# Patient Record
Sex: FEMALE | Race: OTHER | NOT HISPANIC OR LATINO | URBAN - METROPOLITAN AREA
[De-identification: names, ages, dates, MRNs, and addresses within clinical notes are randomized per-mention and may not be internally consistent; named-entity substitution may affect disease eponyms.]

---

## 2018-01-31 ENCOUNTER — EMERGENCY (EMERGENCY)
Facility: HOSPITAL | Age: 53
LOS: 1 days | Discharge: HOME | End: 2018-01-31
Attending: EMERGENCY MEDICINE | Admitting: EMERGENCY MEDICINE
Payer: COMMERCIAL

## 2018-01-31 VITALS
WEIGHT: 169.98 LBS | HEIGHT: 62 IN | RESPIRATION RATE: 16 BRPM | DIASTOLIC BLOOD PRESSURE: 94 MMHG | OXYGEN SATURATION: 100 % | TEMPERATURE: 99 F | SYSTOLIC BLOOD PRESSURE: 167 MMHG | HEART RATE: 72 BPM

## 2018-01-31 PROCEDURE — 99282 EMERGENCY DEPT VISIT SF MDM: CPT

## 2018-01-31 PROCEDURE — 99284 EMERGENCY DEPT VISIT MOD MDM: CPT

## 2018-01-31 RX ORDER — ACETAMINOPHEN 500 MG
650 TABLET ORAL ONCE
Qty: 0 | Refills: 0 | Status: COMPLETED | OUTPATIENT
Start: 2018-01-31 | End: 2018-01-31

## 2018-01-31 RX ADMIN — Medication 650 MILLIGRAM(S): at 13:09

## 2018-01-31 NOTE — ED PROVIDER NOTE - PHYSICAL EXAMINATION
Home
Attending note. Patient is alert and in no acute distress. Patient has a tender scalp on the left parietal and occipital areas. There is a small hematoma without abrasion. Pupils are 3 mm equal reactive. Fundi are normal. Neck is nontender. Patient has full range of motion of her neck without neurologic symptoms. Patient has slight tenderness in the left trapezius muscle area patient has full range of motion of the shoulders and the W. pain or tenderness. Patient has full range of motion of the lowest remedy without pain or tenderness. There is no CVA tenderness. His pelvis tenderness. Neurologic examination is intact including motor, cerebellar, sensory, speech. Romberg is negative.

## 2018-01-31 NOTE — ED ADULT TRIAGE NOTE - CHIEF COMPLAINT QUOTE
I fell in the bathroom hit my back and head; I think I passed out; no I wasn't dizzy before; I slipped on the soap

## 2018-01-31 NOTE — ED PROVIDER NOTE - MUSCULOSKELETAL MINIMAL EXAM
left upper trap tenderness, left forearm superficial, minimal tenderness, right anterior hip minimally tender

## 2018-01-31 NOTE — ED PROVIDER NOTE - WEIGHT BEARING
Tobacco use-I  Informed about risk of wound healing problem ,infection,lung complications,thrombosis with tobacco use   Wants to try Nicotine patches    able

## 2018-01-31 NOTE — ED PROVIDER NOTE - OBJECTIVE STATEMENT
52 year old female who states that she had a slip and fall yesterday in the shower, hitting the back of her head yesterday around 10PM.  As per , their son noted LOC for approx 1-2 min.  Complains of a headache today. No dizziness, seizure activity, photophobia.  Complains of back pain. 52 year old female who states that she had a slip and fall yesterday in the shower, hitting the back of her head yesterday around 10PM.  As per , their son noted LOC for approx 1-2 min.  Complains of a headache today. No dizziness, seizure activity, photophobia.  Complains of back pain.       Attending note. Patient was seen in fast track from #2. Agree with the above. Patient slipped in the shower last evening approximately 10:30 striking her head. Patient reports brief LOC. Patient denies any vomiting, numbness paresthesia, chest area pain or bowel pain. Patient complains of mild headache and bruising to her left scalp and occiput. As well as pain in the left upper back. Patient denies any dizziness, confusion or disorientation.

## 2018-01-31 NOTE — ED ADULT NURSE NOTE - OBJECTIVE STATEMENT
52 y.o female c c/o fall. Pt fell in bathtub yesterday when showering. +LOC. pain to L shoulder/ HA. no vomiting. Pain to R hip. Not on blood thinners. ALer and orientated x3. Family at bedside.

## 2020-02-12 ENCOUNTER — OUTPATIENT (OUTPATIENT)
Dept: OUTPATIENT SERVICES | Facility: HOSPITAL | Age: 55
LOS: 1 days | End: 2020-02-12
Payer: COMMERCIAL

## 2020-02-12 ENCOUNTER — APPOINTMENT (OUTPATIENT)
Dept: CT IMAGING | Facility: IMAGING CENTER | Age: 55
End: 2020-02-12
Payer: COMMERCIAL

## 2020-02-12 DIAGNOSIS — R31.29 OTHER MICROSCOPIC HEMATURIA: ICD-10-CM

## 2020-02-12 PROCEDURE — 74178 CT ABD&PLV WO CNTR FLWD CNTR: CPT | Mod: 26

## 2020-02-12 PROCEDURE — 74178 CT ABD&PLV WO CNTR FLWD CNTR: CPT

## 2020-02-12 PROCEDURE — 82565 ASSAY OF CREATININE: CPT

## 2023-12-09 ENCOUNTER — APPOINTMENT (OUTPATIENT)
Dept: MAMMOGRAPHY | Facility: IMAGING CENTER | Age: 58
End: 2023-12-09
Payer: COMMERCIAL

## 2023-12-09 ENCOUNTER — OUTPATIENT (OUTPATIENT)
Dept: OUTPATIENT SERVICES | Facility: HOSPITAL | Age: 58
LOS: 1 days | End: 2023-12-09
Payer: COMMERCIAL

## 2023-12-09 DIAGNOSIS — Z00.8 ENCOUNTER FOR OTHER GENERAL EXAMINATION: ICD-10-CM

## 2023-12-09 PROCEDURE — 77067 SCR MAMMO BI INCL CAD: CPT

## 2023-12-09 PROCEDURE — 77063 BREAST TOMOSYNTHESIS BI: CPT

## 2023-12-09 PROCEDURE — 77063 BREAST TOMOSYNTHESIS BI: CPT | Mod: 26

## 2023-12-09 PROCEDURE — 77067 SCR MAMMO BI INCL CAD: CPT | Mod: 26
